# Patient Record
(demographics unavailable — no encounter records)

---

## 2025-01-09 NOTE — CONSULT LETTER
[Dear  ___] : Dear  [unfilled], [( Thank you for referring [unfilled] for consultation for _____ )] : Thank you for referring [unfilled] for consultation for [unfilled] [Please see my note below.] : Please see my note below. [Consult Closing:] : Thank you very much for allowing me to participate in the care of this patient.  If you have any questions, please do not hesitate to contact me. [Sincerely,] : Sincerely, [FreeTextEntry3] : Jim Barr MD Sinus & Endoscopic Skull Base Surgery Department of Otolaryngology- Head & Neck Surgery [facility]  Phone: [phone] Fax: [phone]

## 2025-01-09 NOTE — REASON FOR VISIT
[Initial Consultation] : an initial consultation for [FreeTextEntry2] : Referred by Dr Migel Martinez for nasal obstruction

## 2025-01-09 NOTE — HISTORY OF PRESENT ILLNESS
[de-identified] : 49 year old male presents for nasal obstruction  Referred by Dr Migel Martinez  hx sinus surgery about 15 years ago in [place] 2 years ago- noted Left sided nasal congestion, has progressively gotten worsen  Seen by Dr Mccann- noted polyp in the left nostril  Frequent bilateral clear anterior rhinorrhea and PND  Intermittent right epistaxis- only in the winter  Denies facial pain/pressure Sense of smell reduced since sinus surgery Former smoker for 25 years- stopped 3 years ago  One course of amoxicillin and prednisone with significant improvement for a few weeks but then symptoms returned  allergy testing+ dust, dust mites -Currently undergoing allergy shots  Using Flonase daily for weeks with minimal relief  Reports recent CT Sinus (no records)  Denies asthma Denies ASA intolerance  PMH: DM PSH: sinus surgery

## 2025-02-20 NOTE — HISTORY OF PRESENT ILLNESS
[de-identified] : 50 y/o M with hx of CRSwNP presents for follow-up  LCV 01/09/25 at which time prescribed steroid rinse Moderate improvement on rinse Resolved bilateral rhinorrhea and PND Some bloody spottings right nostril Denies nasal congestion, facial pain and pressure No recent sinus infections Using Xhance spray daily.  Has not used mometasone due to good relief from Xhance

## 2025-04-10 NOTE — HISTORY OF PRESENT ILLNESS
[de-identified] : 49 year old male with hx of CRSwNP. Presents for post op evaluation 1 week s/p ESS 4/4/25. PROCEDURES:   Bilateral endoscopic revision maxillary antrostomy with tissue removal.  Bilateral endoscopic revision total ethmoidectomy.  Bilateral endoscopic sphenoidotomy with tissue removal.  Bilateral endoscopic frontal sinusotomy with tissue removal.  Use of stereotactic image navigation.  Reports appropriate post op discomfort and congestion. Taking PO abx and steroids as prescribed. Using saline rinse BID. Denies bleeding and fever.

## 2025-05-13 NOTE — HISTORY OF PRESENT ILLNESS
[de-identified] : 49 year old male hx CRSwNP s/p ESS 4/4/25 presents for post op LCV 4/10/25 at which time debridement  States overall doing well Breathing through the nose is good No recent nasal congestion, anterior rhinorrhea, PND, facial pain/pressure No recent epistaxis  Sense of smell of smell slowly improving  Pt was rinsing 2x a day after the last visit- none in the last week

## 2025-06-19 NOTE — HISTORY OF PRESENT ILLNESS
[de-identified] : 50 y/o M hx of  CRSwNP s/p ESS 4/4/25 presents for post op follow up LCV 05/13/25 at which time debridement Using steroid rinses once a week  Breathing through the nose is good No recent nasal congestion, anterior rhinorrhea, PND, facial pain/pressure No recent epistaxis Sense of smell of smell slowly improving Occasional left otalgia- denies otorrhea